# Patient Record
Sex: MALE | Race: WHITE | NOT HISPANIC OR LATINO | Employment: FULL TIME | URBAN - NONMETROPOLITAN AREA
[De-identification: names, ages, dates, MRNs, and addresses within clinical notes are randomized per-mention and may not be internally consistent; named-entity substitution may affect disease eponyms.]

---

## 2020-01-08 ENCOUNTER — OFFICE VISIT (OUTPATIENT)
Dept: FAMILY MEDICINE | Facility: OTHER | Age: 40
End: 2020-01-08
Attending: FAMILY MEDICINE
Payer: COMMERCIAL

## 2020-01-08 VITALS
BODY MASS INDEX: 28.58 KG/M2 | HEIGHT: 72 IN | WEIGHT: 211 LBS | OXYGEN SATURATION: 98 % | RESPIRATION RATE: 16 BRPM | TEMPERATURE: 98.5 F | DIASTOLIC BLOOD PRESSURE: 60 MMHG | SYSTOLIC BLOOD PRESSURE: 122 MMHG | HEART RATE: 65 BPM

## 2020-01-08 DIAGNOSIS — L29.9 PRURITIC DISORDER: Primary | ICD-10-CM

## 2020-01-08 LAB
ALBUMIN SERPL-MCNC: 4.6 G/DL (ref 3.5–5.7)
ALP SERPL-CCNC: 47 U/L (ref 34–104)
ALT SERPL W P-5'-P-CCNC: 22 U/L (ref 7–52)
ANION GAP SERPL CALCULATED.3IONS-SCNC: 7 MMOL/L (ref 3–14)
AST SERPL W P-5'-P-CCNC: 19 U/L (ref 13–39)
BASOPHILS # BLD AUTO: 0 10E9/L (ref 0–0.2)
BASOPHILS NFR BLD AUTO: 0.7 %
BILIRUB SERPL-MCNC: 0.8 MG/DL (ref 0.3–1)
BUN SERPL-MCNC: 16 MG/DL (ref 7–25)
CALCIUM SERPL-MCNC: 9.1 MG/DL (ref 8.6–10.3)
CHLORIDE SERPL-SCNC: 102 MMOL/L (ref 98–107)
CO2 SERPL-SCNC: 28 MMOL/L (ref 21–31)
CREAT SERPL-MCNC: 0.88 MG/DL (ref 0.7–1.3)
DIFFERENTIAL METHOD BLD: NORMAL
EOSINOPHIL # BLD AUTO: 0.4 10E9/L (ref 0–0.7)
EOSINOPHIL NFR BLD AUTO: 7.1 %
ERYTHROCYTE [DISTWIDTH] IN BLOOD BY AUTOMATED COUNT: 12.5 % (ref 10–15)
GFR SERPL CREATININE-BSD FRML MDRD: >90 ML/MIN/{1.73_M2}
GLUCOSE SERPL-MCNC: 106 MG/DL (ref 70–105)
HCT VFR BLD AUTO: 40.4 % (ref 40–53)
HGB BLD-MCNC: 13.7 G/DL (ref 13.3–17.7)
IMM GRANULOCYTES # BLD: 0 10E9/L (ref 0–0.4)
IMM GRANULOCYTES NFR BLD: 0.4 %
LYMPHOCYTES # BLD AUTO: 2.2 10E9/L (ref 0.8–5.3)
LYMPHOCYTES NFR BLD AUTO: 40.8 %
MCH RBC QN AUTO: 29.4 PG (ref 26.5–33)
MCHC RBC AUTO-ENTMCNC: 33.9 G/DL (ref 31.5–36.5)
MCV RBC AUTO: 87 FL (ref 78–100)
MONOCYTES # BLD AUTO: 0.6 10E9/L (ref 0–1.3)
MONOCYTES NFR BLD AUTO: 11.4 %
NEUTROPHILS # BLD AUTO: 2.1 10E9/L (ref 1.6–8.3)
NEUTROPHILS NFR BLD AUTO: 39.6 %
PLATELET # BLD AUTO: 270 10E9/L (ref 150–450)
POTASSIUM SERPL-SCNC: 3.7 MMOL/L (ref 3.5–5.1)
PROT SERPL-MCNC: 7.4 G/DL (ref 6.4–8.9)
RBC # BLD AUTO: 4.66 10E12/L (ref 4.4–5.9)
SODIUM SERPL-SCNC: 137 MMOL/L (ref 134–144)
TSH SERPL DL<=0.05 MIU/L-ACNC: 2.33 IU/ML (ref 0.34–5.6)
WBC # BLD AUTO: 5.3 10E9/L (ref 4–11)

## 2020-01-08 PROCEDURE — 85025 COMPLETE CBC W/AUTO DIFF WBC: CPT | Mod: ZL | Performed by: FAMILY MEDICINE

## 2020-01-08 PROCEDURE — 99213 OFFICE O/P EST LOW 20 MIN: CPT | Performed by: FAMILY MEDICINE

## 2020-01-08 PROCEDURE — 84443 ASSAY THYROID STIM HORMONE: CPT | Mod: ZL | Performed by: FAMILY MEDICINE

## 2020-01-08 PROCEDURE — 36415 COLL VENOUS BLD VENIPUNCTURE: CPT | Mod: ZL | Performed by: FAMILY MEDICINE

## 2020-01-08 PROCEDURE — 80053 COMPREHEN METABOLIC PANEL: CPT | Mod: ZL | Performed by: FAMILY MEDICINE

## 2020-01-08 RX ORDER — HYDROXYZINE HYDROCHLORIDE 10 MG/1
10 TABLET, FILM COATED ORAL EVERY 8 HOURS PRN
Qty: 90 TABLET | Refills: 1 | Status: SHIPPED | OUTPATIENT
Start: 2020-01-08 | End: 2020-01-24

## 2020-01-08 SDOH — HEALTH STABILITY: MENTAL HEALTH: HOW OFTEN DO YOU HAVE A DRINK CONTAINING ALCOHOL?: MONTHLY OR LESS

## 2020-01-08 ASSESSMENT — PAIN SCALES - GENERAL: PAINLEVEL: NO PAIN (0)

## 2020-01-08 ASSESSMENT — ENCOUNTER SYMPTOMS
NAUSEA: 0
DYSURIA: 0
NUMBNESS: 0
PARESTHESIAS: 0
DIFFICULTY URINATING: 0
CHILLS: 0
SHORTNESS OF BREATH: 0
FEVER: 0
VOMITING: 0
ABDOMINAL PAIN: 0

## 2020-01-08 ASSESSMENT — MIFFLIN-ST. JEOR: SCORE: 1905.09

## 2020-01-08 NOTE — PROGRESS NOTES
SUBJECTIVE:   Kemal Garibay is a 39 year old male who presents to clinic today for the following health issues:    HPI  Pruritus: Reports that symptoms have been present over the past 2 years and worsening.  Describes itching diffusely over his skin, occasionally breaking out in raised lesions.  Symptoms persist despite living in 3 different locations or staying at others' houses.  Denies any new medications.  No new personal hygiene products.  Symptoms are worsened by hot showers.  He has been taking OTC antihistamine medications and occasionally using hydrocortisone cream with some improvement in his symptoms.  He occasionally takes omeprazole for acid reflux.  No official history of allergies or eczema.    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 Arshad     History reviewed. No pertinent family history.  Social History     Tobacco Use     Smoking status: Never Smoker     Smokeless tobacco: Current User     Types: Chew   Substance Use Topics     Alcohol use: Yes     Frequency: Monthly or less     Current Outpatient Medications   Medication Sig Dispense Refill     hydrOXYzine (ATARAX) 10 MG tablet Take 1 tablet (10 mg) by mouth every 8 hours as needed for itching 90 tablet 1     No Known Allergies    Review of Systems   Constitutional: Negative for chills and fever.   Respiratory: Negative for shortness of breath.    Cardiovascular: Negative for chest pain.   Gastrointestinal: Negative for abdominal pain, nausea and vomiting.   Genitourinary: Negative for difficulty urinating and dysuria.   Neurological: Negative for numbness and paresthesias.      OBJECTIVE:     /60   Pulse 65   Temp 98.5  F (36.9  C) (Temporal)   Resp 16   Ht 1.829 m (6')   Wt 95.7 kg (211 lb)   SpO2 98%   BMI 28.62  kg/m    Body mass index is 28.62 kg/m .  Physical Exam  Constitutional:       General: He is not in acute distress.     Appearance: Normal appearance. He is not ill-appearing.   HENT:      Mouth/Throat:      Comments: Tongue fasciculations.  Cardiovascular:      Rate and Rhythm: Normal rate and regular rhythm.      Heart sounds: No murmur. No friction rub. No gallop.    Pulmonary:      Effort: Pulmonary effort is normal.      Breath sounds: Normal breath sounds. No wheezing, rhonchi or rales.   Abdominal:      General: Bowel sounds are normal.      Palpations: Abdomen is soft.   Skin:     Comments: Erythematous, mildly raised lines, with scratching of skin.   Neurological:      Mental Status: He is alert.         ASSESSMENT/PLAN:     1. Pruritic disorder  Dermatographia vs urticaria.  Will check CBC, CMP, and TSH to help rule out uremia, hyperbilirubinemia, polycythemia vera, and thyroid disorder as potential causes.  Continue daily antihistamine use.  Hydroxyzine provided as needed for itching.  Consider addition of H2 blocker.  Follow-up in 4 weeks for reassessment.  - hydrOXYzine (ATARAX) 10 MG tablet; Take 1 tablet (10 mg) by mouth every 8 hours as needed for itching  Dispense: 90 tablet; Refill: 1  - TSH  - CBC and Differential  - Comprehensive Metabolic Panel      DO DON Buchanan [facility] AND [facility]

## 2020-01-08 NOTE — NURSING NOTE
Chief Complaint   Patient presents with     Derm Problem     itchy skin getting worse x 2 years, mostly at night         Initial /60   Pulse 65   Temp 98.5  F (36.9  C) (Temporal)   Resp 16   Ht 1.829 m (6')   Wt 95.7 kg (211 lb)   SpO2 98%   BMI 28.62 kg/m   Estimated body mass index is 28.62 kg/m  as calculated from the following:    Height as of this encounter: 1.829 m (6').    Weight as of this encounter: 95.7 kg (211 lb).    Medication Reconciliation: complete      Norma J. Gosselin, LPN

## 2020-01-08 NOTE — PATIENT INSTRUCTIONS
Nonspecific Management of Pruritus  Use skin lubricants liberally: petrolatum or lubricant cream at bedtime; alcohol-free, hypoallergenic lotions frequently during the day.    Decrease frequency of bathing and limit bathing to brief exposure to tepid water; after bathing, briefly pat skin dry and immediately apply skin lubricant.    Use mild, unscented, hypoallergenic soap two to three times per week; limit daily use of soap to groin and axillae (spare legs, arms, and torso).    Humidify dry indoor environment, especially in winter.    Choose clothing that does not irritate the skin (preferably made of doubly rinsed cotton or silk); avoid clothing made of wool, smooth-textured cotton, or heat-retaining material (synthetic fabrics); when washing sheets, add bath oil (e.g., Alpha Kathy) to rinse cycle.    Avoid use of vasodilators (caffeine, alcohol, spices, hot water) and excessive sweating.    Avoid use of provocative topical medications, such as corticosteroids for prolonged periods (risk of skin atrophy) and topical anesthetics and antihistamines (may sensitize exposed skin and increase risk of allergic contact dermatitis).    Prevent complications of scratching by keeping fingernails short and clean, and by rubbing skin with the palms of the hands if urge to scratch is irresistible.

## 2020-01-17 ENCOUNTER — TELEPHONE (OUTPATIENT)
Dept: FAMILY MEDICINE | Facility: OTHER | Age: 40
End: 2020-01-17

## 2020-01-23 NOTE — RESULT ENCOUNTER NOTE
If he has seen no improvement with medications and the lifestyle adjustments we discussed, he can return for an additional office visit.

## 2020-01-24 ENCOUNTER — OFFICE VISIT (OUTPATIENT)
Dept: FAMILY MEDICINE | Facility: OTHER | Age: 40
End: 2020-01-24
Attending: FAMILY MEDICINE
Payer: COMMERCIAL

## 2020-01-24 VITALS
OXYGEN SATURATION: 95 % | RESPIRATION RATE: 16 BRPM | HEART RATE: 70 BPM | WEIGHT: 211 LBS | HEIGHT: 72 IN | SYSTOLIC BLOOD PRESSURE: 130 MMHG | BODY MASS INDEX: 28.58 KG/M2 | TEMPERATURE: 97.2 F | DIASTOLIC BLOOD PRESSURE: 68 MMHG

## 2020-01-24 DIAGNOSIS — L29.9 PRURITIC DISORDER: Primary | ICD-10-CM

## 2020-01-24 PROCEDURE — 99213 OFFICE O/P EST LOW 20 MIN: CPT | Performed by: FAMILY MEDICINE

## 2020-01-24 RX ORDER — HYDROXYZINE HYDROCHLORIDE 25 MG/1
25 TABLET, FILM COATED ORAL
Qty: 30 TABLET | Refills: 1 | Status: SHIPPED | OUTPATIENT
Start: 2020-01-24 | End: 2022-03-29

## 2020-01-24 ASSESSMENT — MIFFLIN-ST. JEOR: SCORE: 1905.09

## 2020-01-24 ASSESSMENT — PAIN SCALES - GENERAL: PAINLEVEL: NO PAIN (0)

## 2020-01-24 ASSESSMENT — ENCOUNTER SYMPTOMS
FEVER: 0
CHILLS: 0

## 2020-01-24 NOTE — PROGRESS NOTES
SUBJECTIVE:   Kemal Garibay is a 39 year old male who presents to clinic today for the following health issues:    HPI  Pruritus:  Has been taking Hydroxyzine as needed.  Finds that it does well if he takes two tablets at night.  He has been taking cooler showers and states that this seems to help some.  He stopped taking allergy medicine.  Symptoms continue to be worse at night and have persisted throughout multiple moves.  He has given up having both a dog and a cat.  He has switched to a hypoallergenic laundry detergent.  States that overall symptoms are not worse but are not improved.    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 Arshad     History reviewed. No pertinent family history.  Social History     Tobacco Use     Smoking status: Never Smoker     Smokeless tobacco: Current User     Types: Chew   Substance Use Topics     Alcohol use: Yes     Frequency: Monthly or less     Current Outpatient Medications   Medication Sig Dispense Refill     hydrOXYzine (ATARAX) 10 MG tablet Take 1 tablet (10 mg) by mouth every 8 hours as needed for itching 90 tablet 1     No Known Allergies    Review of Systems   Constitutional: Negative for chills and fever.      OBJECTIVE:     /68   Pulse 70   Temp 97.2  F (36.2  C) (Temporal)   Resp 16   Ht 1.829 m (6')   Wt 95.7 kg (211 lb)   SpO2 95%   BMI 28.62 kg/m    Body mass index is 28.62 kg/m .  Physical Exam  Constitutional:       Appearance: Normal appearance.   Cardiovascular:      Rate and Rhythm: Normal rate.   Pulmonary:      Effort: Pulmonary effort is normal.   Skin:     Comments: Random areas of erythema on skin which appear to be secondary to contact.   Neurological:      Mental Status: He is alert.   Psychiatric:         Mood and Affect:  Mood normal.       ASSESSMENT/PLAN:     1. Pruritic disorder  Unclear etiology.  Encouraged daily use of H1 antagonist (Zyrtec, Allegra, or Claritin) in addition to H2 antagonist (Pepcid).  Increase Hydroxyzine to 25 mg for use at night as this is how it has been helping him.  Continue lifestyle modifications.  Encouraged use of hypoallergenic non-scented laundry detergent, fabric softener, body wash, soaps, and lotions.  Referral for allergy testing.  If no improvement with antihistamines and avoidance, will plan for dermatology referral.  - ALLERGY/ASTHMA ADULT REFERRAL  - hydrOXYzine (ATARAX) 25 MG tablet; Take 1 tablet (25 mg) by mouth nightly as needed for itching  Dispense: 30 tablet; Refill: 1      DO DON Buchanan [facility] AND \[facility] [place] [facility]\""

## 2020-01-24 NOTE — NURSING NOTE
Chief Complaint   Patient presents with     RECHECK     itching     Still itching.    Initial /68   Pulse 70   Temp 97.2  F (36.2  C) (Temporal)   Resp 16   Ht 1.829 m (6')   Wt 95.7 kg (211 lb)   SpO2 95%   BMI 28.62 kg/m   Estimated body mass index is 28.62 kg/m  as calculated from the following:    Height as of this encounter: 1.829 m (6').    Weight as of this encounter: 95.7 kg (211 lb).    Medication Reconciliation: complete      Norma J. Gosselin, LPN

## 2020-03-11 ENCOUNTER — HEALTH MAINTENANCE LETTER (OUTPATIENT)
Age: 40
End: 2020-03-11

## 2020-09-29 ENCOUNTER — VIRTUAL VISIT (OUTPATIENT)
Dept: FAMILY MEDICINE | Facility: OTHER | Age: 40
End: 2020-09-29
Attending: PHYSICIAN ASSISTANT
Payer: COMMERCIAL

## 2020-09-29 DIAGNOSIS — Z20.822 EXPOSURE TO COVID-19 VIRUS: ICD-10-CM

## 2020-09-29 DIAGNOSIS — Z20.822 EXPOSURE TO COVID-19 VIRUS: Primary | ICD-10-CM

## 2020-09-29 PROCEDURE — 99212 OFFICE O/P EST SF 10 MIN: CPT | Mod: TEL | Performed by: PHYSICIAN ASSISTANT

## 2020-09-29 PROCEDURE — 99207 ZZC NO CHARGE NURSE ONLY: CPT

## 2020-09-29 PROCEDURE — C9803 HOPD COVID-19 SPEC COLLECT: HCPCS

## 2020-09-29 PROCEDURE — U0003 INFECTIOUS AGENT DETECTION BY NUCLEIC ACID (DNA OR RNA); SEVERE ACUTE RESPIRATORY SYNDROME CORONAVIRUS 2 (SARS-COV-2) (CORONAVIRUS DISEASE [COVID-19]), AMPLIFIED PROBE TECHNIQUE, MAKING USE OF HIGH THROUGHPUT TECHNOLOGIES AS DESCRIBED BY CMS-2020-01-R: HCPCS | Mod: ZL | Performed by: PHYSICIAN ASSISTANT

## 2020-09-29 RX ORDER — FAMOTIDINE 20 MG/1
20 TABLET, FILM COATED ORAL
COMMUNITY
Start: 2020-02-06 | End: 2023-04-18

## 2020-09-29 ASSESSMENT — PAIN SCALES - GENERAL: PAINLEVEL: NO PAIN (0)

## 2020-09-29 NOTE — PATIENT INSTRUCTIONS
Regardless of if you have been tested or not for COVID-19:  -       You've had no fever--and no medicine that reduces fever--for 1 full day (24 hours), and    -       Your other symptoms have resolved (gotten better). For example, your cough or breathing has improved, and   -       At least 10 days have passed since your symptoms started    Patient who have symptoms (cough, fever, or shortness of breath), need to isolate for 10 days from when symptoms started OR 24 hours after fever resolves (without fever reducing medications) AND improvement of respiratory symptoms (whichever is longer).       Isolate yourself at home (in own room/own bathroom if possible)    Do Not allow any visitors    Do Not go to work or school    Do Not go to [employer],  centers, shopping, or other public places.    Do Not shake hands.    Avoid close and intimate contact with others (hugging, kissing).    Follow CDC recommendations for household cleaning of frequently touched services.      After the initial 10 days, continue to isolate yourself from household members as much as possible. To continue decrease the risk of community spread and exposure, you and any members of your household should limit activities in public for 14 days after starting home isolation.      You can reference the following CDC link for helpful home isolation/care tips:  https://www.cdc.gov/coronavirus/2019-ncov/downloads/10Things.pdf     Protect Others:    Cover Your Mouth and Nose with a mask, disposable tissue or wash cloth to avoid spreading germs to others.    Wash your hands and face frequently with soap and water     Call Back If: Breathing difficulty develops or you become worse.     For more information about COVID19 and options for caring for yourself at home, please visit the CDC website at https://www.cdc.gov/coronavirus/2019-ncov/about/steps-when-sick.html  For more options for care at [facility], please visit our website at  [email or web address]

## 2020-09-29 NOTE — NURSING NOTE
Chief Complaint   Patient presents with     Covid 19 Testing     Patient is calling to see if he can get a covid test due to living with a patient who came back positive today and his work is requiring him to be tested.    Initial There were no vitals taken for this visit. Estimated body mass index is 28.62 kg/m  as calculated from the following:    Height as of 1/24/20: 1.829 m (6').    Weight as of 1/24/20: 95.7 kg (211 lb).    Medication Reconciliation: complete      Lucila Driscoll LPN

## 2020-09-29 NOTE — PROGRESS NOTES
"Kemal Garibay is a 40 year old male who is being evaluated via a billable telephone visit.      The patient has been notified of following: Lucila Driscoll LPN .......  9/29/2020  1:40 PM      \"This telephone visit will be conducted via a call between you and your physician/provider. We have found that certain health care needs can be provided without the need for a physical exam.  This service lets us provide the care you need with a short phone conversation.  If a prescription is necessary we can send it directly to your pharmacy.  If lab work is needed we can place an order for that and you can then stop by our lab to have the test done at a later time.    Telephone visits are billed at different rates depending on your insurance coverage. During this emergency period, for some insurers they may be billed the same as an in-person visit.  Please reach out to your insurance provider with any questions.    If during the course of the call the physician/provider feels a telephone visit is not appropriate, you will not be charged for this service.\"    Patient has given verbal consent for Telephone visit?  Yes    What phone number would you like to be contacted at? [phone]    How would you like to obtain your AVS? Kobe Hernández     Kemal Garibay is a 40 year old male who presents via phone visit today for the following health issues:    HPI    Patient is calling interested in getting a COVID-19 test.  His wife tested positive for COVID-19 this morning.  Patient states that he needs to be tested for his job.  Patient is currently not having symptoms and is feeling well.  No recent fevers, chills, cough, shortness of breath or difficulty breathing, new muscle or body aches, new headache, new loss of taste or smell, sore throat, congestion or runny nose, new and unexplained nausea or vomiting, diarrhea, or new and unexplained fatigue.     Review of Systems   Constitutional, HEENT, cardiovascular, pulmonary, gi " and gu systems are negative, except as otherwise noted.       Objective          Vitals:  No vitals were obtained today due to virtual visit.    healthy, alert and no distress  PSYCH: Alert and oriented times 3; coherent speech, normal   rate and volume, able to articulate logical thoughts, able   to abstract reason, no tangential thoughts, no hallucinations   or delusions  His affect is normal  RESP: No cough, no audible wheezing, able to talk in full sentences  Remainder of exam unable to be completed due to telephone visits          Assessment/Plan:    Assessment & Plan   Problem List Items Addressed This Visit     None           Ordered COVID-19 test to be completed due to the exposure.  Gave patient education.    Patient Instructions   Regardless of if you have been tested or not for COVID-19:  -       You've had no fever--and no medicine that reduces fever--for 1 full day (24 hours), and    -       Your other symptoms have resolved (gotten better). For example, your cough or breathing has improved, and   -       At least 10 days have passed since your symptoms started    Patient who have symptoms (cough, fever, or shortness of breath), need to isolate for 10 days from when symptoms started OR 24 hours after fever resolves (without fever reducing medications) AND improvement of respiratory symptoms (whichever is longer).       Isolate yourself at home (in own room/own bathroom if possible)    Do Not allow any visitors    Do Not go to work or school    Do Not go to Religion,  centers, shopping, or other public places.    Do Not shake hands.    Avoid close and intimate contact with others (hugging, kissing).    Follow CDC recommendations for household cleaning of frequently touched services.      After the initial 10 days, continue to isolate yourself from household members as much as possible. To continue decrease the risk of community spread and exposure, you and any members of your household should  limit activities in public for 14 days after starting home isolation.      You can reference the following CDC link for helpful home isolation/care tips:  https://www.cdc.gov/coronavirus/2019-ncov/downloads/10Things.pdf     Protect Others:    Cover Your Mouth and Nose with a mask, disposable tissue or wash cloth to avoid spreading germs to others.    Wash your hands and face frequently with soap and water     Call Back If: Breathing difficulty develops or you become worse.     For more information about COVID19 and options for caring for yourself at home, please visit the CDC website at https://www.cdc.gov/coronavirus/2019-ncov/about/steps-when-sick.html  For more options for care at [facility], please visit our website at [email or web address]           No follow-ups on file.    Kacey Hernandez PA-C  [facility]    Phone call duration:  7 minutes

## 2020-10-01 LAB
SARS-COV-2 RNA SPEC QL NAA+PROBE: NOT DETECTED
SPECIMEN SOURCE: NORMAL

## 2020-11-25 DIAGNOSIS — L29.9 PRURITIC DISORDER: ICD-10-CM

## 2020-11-25 RX ORDER — HYDROXYZINE HYDROCHLORIDE 10 MG/1
10 TABLET, FILM COATED ORAL EVERY 8 HOURS PRN
Qty: 90 TABLET | Refills: 0 | Status: SHIPPED | OUTPATIENT
Start: 2020-11-25 | End: 2022-03-29

## 2020-11-25 NOTE — TELEPHONE ENCOUNTER
[facility] sent Rx request for the following:   hydrOXYzine (ATARAX) 10 MG tablet  Sig: Take 1 tablet (10 mg) by mouth every 8 hours as needed for itching    Last Prescription Date:   01/24/2020  Last Fill Qty/Refills:         30, R-1    Last Office Visit:              01/24/2020 (Jennifer)   Future Office visit:           None noted    Antihistamines Protocol Passed - 11/25/2020 12:17 PM     Prescription approved per [facility] [facility].  Laura Vela RN ....................  11/25/2020   4:09 PM

## 2021-01-03 ENCOUNTER — HEALTH MAINTENANCE LETTER (OUTPATIENT)
Age: 41
End: 2021-01-03

## 2021-04-25 ENCOUNTER — HEALTH MAINTENANCE LETTER (OUTPATIENT)
Age: 41
End: 2021-04-25

## 2021-10-09 ENCOUNTER — HEALTH MAINTENANCE LETTER (OUTPATIENT)
Age: 41
End: 2021-10-09

## 2021-11-09 ENCOUNTER — ALLIED HEALTH/NURSE VISIT (OUTPATIENT)
Dept: FAMILY MEDICINE | Facility: OTHER | Age: 41
End: 2021-11-09
Attending: FAMILY MEDICINE
Payer: COMMERCIAL

## 2021-11-09 DIAGNOSIS — Z20.822 COVID-19 RULED OUT: Primary | ICD-10-CM

## 2021-11-09 DIAGNOSIS — J02.0 STREPTOCOCCAL SORE THROAT: ICD-10-CM

## 2021-11-09 DIAGNOSIS — R09.81 CONGESTION OF PARANASAL SINUS: ICD-10-CM

## 2021-11-09 PROCEDURE — C9803 HOPD COVID-19 SPEC COLLECT: HCPCS

## 2021-11-09 PROCEDURE — U0003 INFECTIOUS AGENT DETECTION BY NUCLEIC ACID (DNA OR RNA); SEVERE ACUTE RESPIRATORY SYNDROME CORONAVIRUS 2 (SARS-COV-2) (CORONAVIRUS DISEASE [COVID-19]), AMPLIFIED PROBE TECHNIQUE, MAKING USE OF HIGH THROUGHPUT TECHNOLOGIES AS DESCRIBED BY CMS-2020-01-R: HCPCS | Mod: ZL

## 2021-11-10 LAB — SARS-COV-2 RNA RESP QL NAA+PROBE: POSITIVE

## 2022-03-29 ENCOUNTER — OFFICE VISIT (OUTPATIENT)
Dept: FAMILY MEDICINE | Facility: OTHER | Age: 42
End: 2022-03-29
Attending: FAMILY MEDICINE
Payer: COMMERCIAL

## 2022-03-29 VITALS
WEIGHT: 228 LBS | HEIGHT: 72 IN | BODY MASS INDEX: 30.88 KG/M2 | RESPIRATION RATE: 16 BRPM | SYSTOLIC BLOOD PRESSURE: 126 MMHG | DIASTOLIC BLOOD PRESSURE: 68 MMHG | TEMPERATURE: 97.8 F | HEART RATE: 76 BPM | OXYGEN SATURATION: 93 %

## 2022-03-29 DIAGNOSIS — R73.03 PREDIABETES: ICD-10-CM

## 2022-03-29 DIAGNOSIS — J35.1 ENLARGED TONSILS: ICD-10-CM

## 2022-03-29 DIAGNOSIS — R40.0 DAYTIME SOMNOLENCE: ICD-10-CM

## 2022-03-29 DIAGNOSIS — Z00.00 ANNUAL PHYSICAL EXAM: Primary | ICD-10-CM

## 2022-03-29 DIAGNOSIS — R06.83 SNORING: ICD-10-CM

## 2022-03-29 DIAGNOSIS — L50.3 DERMATOGRAPHISM: ICD-10-CM

## 2022-03-29 LAB
CHOLEST SERPL-MCNC: 197 MG/DL
FASTING STATUS PATIENT QL REPORTED: YES
HBA1C MFR BLD: 6 % (ref 4–6.2)
HDLC SERPL-MCNC: 31 MG/DL (ref 23–92)
LDLC SERPL CALC-MCNC: 114 MG/DL
NONHDLC SERPL-MCNC: 166 MG/DL
TRIGL SERPL-MCNC: 262 MG/DL

## 2022-03-29 PROCEDURE — 80061 LIPID PANEL: CPT | Mod: ZL | Performed by: FAMILY MEDICINE

## 2022-03-29 PROCEDURE — 99212 OFFICE O/P EST SF 10 MIN: CPT | Mod: 25 | Performed by: FAMILY MEDICINE

## 2022-03-29 PROCEDURE — 90715 TDAP VACCINE 7 YRS/> IM: CPT | Performed by: FAMILY MEDICINE

## 2022-03-29 PROCEDURE — 83036 HEMOGLOBIN GLYCOSYLATED A1C: CPT | Mod: ZL | Performed by: FAMILY MEDICINE

## 2022-03-29 PROCEDURE — 36415 COLL VENOUS BLD VENIPUNCTURE: CPT | Mod: ZL | Performed by: FAMILY MEDICINE

## 2022-03-29 PROCEDURE — 90471 IMMUNIZATION ADMIN: CPT | Performed by: FAMILY MEDICINE

## 2022-03-29 PROCEDURE — 99396 PREV VISIT EST AGE 40-64: CPT | Performed by: FAMILY MEDICINE

## 2022-03-29 RX ORDER — HYDROXYZINE HYDROCHLORIDE 25 MG/1
25 TABLET, FILM COATED ORAL DAILY PRN
Qty: 90 TABLET | Refills: 1 | Status: SHIPPED | OUTPATIENT
Start: 2022-03-29 | End: 2023-04-18

## 2022-03-29 ASSESSMENT — PAIN SCALES - GENERAL: PAINLEVEL: NO PAIN (0)

## 2022-03-29 NOTE — PATIENT INSTRUCTIONS
Watch caffeine and alcohol intake in regards to reflux  Eat 2 hours before laying   Track behavior and food intake when you have a reflux bout    Referral for sleep study. Trying for a home sleep study  Depending on results could see ENT surgeon about tonsils

## 2022-03-29 NOTE — PROGRESS NOTES
Assessment & Plan       ICD-10-CM    1. 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 Enlarged tonsils  J35.1    4. Daytime somnolence  R40.0 OR SLEEP STUDY, UNATTENDED, SIMUL RECORD HR/O2 SAT/RESP FLOW/RESP EFF     Adult Sleep Eval & Management  Referral   5. Snoring  R06.83 OR SLEEP STUDY, UNATTENDED, SIMUL RECORD HR/O2 SAT/RESP FLOW/RESP EFF     Adult Sleep Eval & Management  Referral   6. Prediabetes  R73.03 Nutrition Referral   7. Body mass index (BMI) 30.0-30.9, adult  Z68.30 Nutrition Referral     Reviewed preventative maintenance.  Tdap updated  Declines COVID-19 and influenza vaccines.  Had COVID-19 November 2021 and received monoclonal antibody infusion, which should delay vaccination.  He is aware that he did not have a robust immune response because of the monoclonal antibody treatment.    Screening lipids obtained.  Low HDL.  ASCVD risk does not warrant statin treatment.  The 10-year ASCVD risk score ([place] DC Jr., et al., 2013) is: 2.6%    Values used to calculate the score:      Age: 41 years      Sex: Male      Is Non- : No      Diabetic: No      Tobacco smoker: No      Systolic Blood Pressure: 126 mmHg      Is BP treated: No      HDL Cholesterol: 31 mg/dL      Total Cholesterol: 197 mg/dL     A1c obtained given family history of diabetes.  Return in prediabetes range.  Referral placed to dietitian for further advice.  He does plan to resume regular exercise and try to drop weight.  This would help prevent progression to diabetes.    Recommend returning in a year to recheck lipids and A1c    Refilled hydroxyzine as needed for dermatographia is him    Has enlarged tonsils, but not recurrent tonsillitis.  Snores and has symptoms of sleep  apnea.  Only indication potentially for tonsillectomy would be some degree of sleep apnea.  Recommend evaluation of sleep apnea.  If he has a mild disorder, potentially surgical treatment with ENT could be of value or could consider mandibular advancement device.  Otherwise CPAP would be primary treatment.  Referral placed for home sleep study.  He has no risk factors that preclude at home study.    Review of prior external note(s) from - [facility]where information from [facility] reviewed       Tobacco Cessation:   reports that he has never smoked. His smokeless tobacco use includes chew.  Tobacco Cessation Action Plan: Information offered: Patient not interested at this time    BMI:   Estimated body mass index is 30.92 kg/m  as calculated from the following:    Height as of this encounter: 1.829 m (6').    Weight as of this encounter: 103.4 kg (228 lb).   Weight management plan: Discussed healthy diet and exercise guidelines    Return in about 1 year (around 3/29/2023).     50 minutes spent on the date of the encounter doing chart review, interpretation of tests, patient visit and documentation       Arnol Bryant MD  [facility]    Edgar Sommer is a 41 year old who presents for the following health issues     History of Present Illness       Reason for visit:  Annual check up; establish PCP  Symptom onset:  Today  Symptoms include:  None    He eats 2-3 servings of fruits and vegetables daily.He consumes 0 sweetened beverage(s) daily.He exercises with enough effort to increase his heart rate 9 or less minutes per day.  He exercises with enough effort to increase his heart rate 3 or less days per week.   He is taking medications regularly.     Dentist noted enlarged tonsils  Snores, potentially witnessed apneas  Sleeps 4-6 hours per night    Some times of reflux, only a few times in general.  No significant heartburn, but on famotidine  No smoking. Rare alcohol. Some coffee.    Has  itching to skin at night. Using famotidine nightly for itching.  When symptoms are worse, uses hydroxyzine as needed  Had evaluation with [facility] allergy in February 2020.  Diagnosed with dermatographia some.  Testing was negative.  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 Arshad      Family History   Problem Relation Age of Onset     No Known Problems Mother      Diabetes Father      Chronic Obstructive Pulmonary Disease Father      No Known Problems Maternal Grandmother      Coronary Artery Disease Maternal Grandfather 82     Diabetes Paternal Grandfather      Colon Cancer No family hx of      Prostate Cancer No family hx of             Objective    /68 (BP Location: Right arm, Patient Position: Sitting, Cuff Size: Adult Large)   Pulse 76   Temp 97.8  F (36.6  C) (Tympanic)   Resp 16   Ht 1.829 m (6')   Wt 103.4 kg (228 lb)   SpO2 93%   BMI 30.92 kg/m    Body mass index is 30.92 kg/m .  Physical Exam   General Appearance: Pleasant, alert, appropriate appearance for age. No acute distress  Eyes: EOMI, PERRL, no conjunctival injection  Ear Exam: Normal TM's bilaterally.   OroPharynx Exam: Normal pharynx.  2+ tonsils.  With palate elevation he has plenty of space.  No other abnormalities on pharyngeal exam.  Neck Exam: Supple, no masses or nodes.  Chest/Respiratory Exam: Normal chest wall and respirations. Clear to  auscultation.  Cardiovascular Exam: Regular rate and rhythm. S1, S2, no murmur, click, gallop, or rubs.  Gastrointestinal Exam: Soft, nontender, no abnormal masses or organomegaly.  Extremities: 2+ pedal pulses.  No lower extremity edema.  Neuro Exam: Alert, oriented x 3. CN II-XI intact. Reflexes 2+, strength symmetric upper and lower extremities  Psychiatric: Normal affect and mentation      Results for orders placed or performed in visit on 03/29/22   Lipid Panel     Status: Abnormal   Result Value Ref Range    Cholesterol 197 <200 mg/dL    Triglycerides 262 (H) <150 mg/dL    Direct Measure HDL 31 23 - 92 mg/dL    LDL Cholesterol Calculated 114 (H) <=100 mg/dL    Non HDL Cholesterol 166 (H) <130 mg/dL    Patient Fasting > 8hrs? Yes     Narrative    Cholesterol  Desirable:  <200 mg/dL    Triglycerides  Normal:  Less than 150 mg/dL  Borderline High:  150-199 mg/dL  High:  200-499 mg/dL  Very High:  Greater than or equal to 500 mg/dL    Direct Measure HDL  Female:  Greater than or equal to 50 mg/dL   Male:  Greater than or equal to 40 mg/dL    LDL Cholesterol  Desirable:  <100mg/dL  Above Desirable:  100-129 mg/dL   Borderline High:  130-159 mg/dL   High:  160-189 mg/dL   Very High:  >= 190 mg/dL    Non HDL Cholesterol  Desirable:  130 mg/dL  Above Desirable:  130-159 mg/dL  Borderline High:  160-189 mg/dL  High:  190-219 mg/dL  Very High:  Greater than or equal to 220 mg/dL   Hemoglobin A1c     Status: Normal   Result Value Ref Range    Hemoglobin A1C 6.0 4.0 - 6.2 %

## 2022-03-29 NOTE — NURSING NOTE
Patient presents to the clinic for establish care with concerns about tonsils.    FOOD SECURITY SCREENING QUESTIONS:    The next two questions are to help us understand your food security.  If you are feeling you need any assistance in this area, we have resources available to support you today.    Hunger Vital Signs:  Within the past 12 months we worried whether our food would run out before we got money to buy more. Never  Within the past 12 months the food we bought just didn't last and we didn't have money to get more. Never    Advance Care Directive on file? no  Advance Care Directive provided to patient? Declined.    Chief Complaint   Patient presents with     Establish Care     Throat Problem       Initial /68 (BP Location: Right arm, Patient Position: Sitting, Cuff Size: Adult Large)   Pulse 76   Temp 97.8  F (36.6  C) (Tympanic)   Resp 16   Ht 1.829 m (6')   Wt 103.4 kg (228 lb)   SpO2 93%   BMI 30.92 kg/m   Estimated body mass index is 30.92 kg/m  as calculated from the following:    Height as of this encounter: 1.829 m (6').    Weight as of this encounter: 103.4 kg (228 lb).  Medication Reconciliation: complete        Noemi Holman LPN

## 2022-04-01 ENCOUNTER — OFFICE VISIT (OUTPATIENT)
Dept: FAMILY MEDICINE | Facility: OTHER | Age: 42
End: 2022-04-01
Attending: FAMILY MEDICINE
Payer: COMMERCIAL

## 2022-04-01 VITALS — HEIGHT: 72 IN | BODY MASS INDEX: 30.88 KG/M2 | WEIGHT: 228 LBS

## 2022-04-01 DIAGNOSIS — R73.03 PREDIABETES: ICD-10-CM

## 2022-04-01 PROCEDURE — 97802 MEDICAL NUTRITION INDIV IN: CPT | Performed by: DIETITIAN, REGISTERED

## 2022-04-01 NOTE — PROGRESS NOTES
Nutrition Assessment    Patient seen for outpatient MNT initial assessment.    Referring diagnosis: prediabetes, BMI 30, hyperlipidemia     Height: Height: 1.829 m (6')  Weight: 228  BMI:30  BMI indication: 30-34.9 obesity (class 1)  Patient's Goal Weight: 200#    Labs: LDL Cholesterol Calculated       Date                     Value               Ref Range           Status                03/29/2022               114 (H)             <=100 mg/dL         Final            ----------  Direct Measure HDL       Date                     Value               Ref Range           Status                03/29/2022               31                  23 - 92 mg/dL       Final            ----------)  Lab Results       Component                Value               Date                       A1C                      6.0                 03/29/2022                Lifestyle changes made prior to nutrition session:  He and his wife are changing meal plans and thinking about getting back to the gym      Assessment of diet/weight history: Hx of trying vegan choices, he enjoyed that. Was doing overnight oatmeal for lunch. Eats all food groups, limiting dairy.      Assessment of physical activity: quit the gym with covid shut down but will join again. He enjoys exercise. Kemal has changed to more sitting so he knows this impacts his health.  Discussed min goal of 150 min of cardio per week.    Nutrition intervention that addressed medical nutrition therapy for above diagnosis: Weight management. Discussed 2000 calorie level. Meal plan provided and Lipid lowering strategies. Discussed 20 grams of saturated fat, 200mg of cholesterol, with soluble fiber foods, and plant stanol/sterols.  Gave Mediterranean diet guidelines.     RD reviewed diabetes, physical activity and Mediterranean diet    Education materials provided: sample meal plans with grocery lists. Good fat guidelines.    Goals: (1) A1C >5.7% in 6 months  (2) weight loss 5-7% in 6  months    Patient had no barriers to learning, verbalized understanding, and compliance is expected.    Phone number provided for questions. Recommended follow-up in as needed.    Time spent: 30 min  KRISTIN K. KLINEFELTER, RD on 4/1/2022 at 10:41 AM              ROS      Physical Exam

## 2022-04-12 ENCOUNTER — DOCUMENTATION ONLY (OUTPATIENT)
Dept: SLEEP MEDICINE | Facility: HOSPITAL | Age: 42
End: 2022-04-12

## 2022-04-12 NOTE — PROGRESS NOTES
STOP BANG       Name: Kemal Garibay MRN# [id number]   Age: 42 year old YOB: 1980     Stop Bang questionnaire completed with a score of >3 to allow for HST     Have you been told you snore loudly (louder than talking or loud enough to be heard through doors)? YES    Do you often feel tired, fatigued, or sleepy during the daytime? YES    Has anyone observed you stop breathing during your sleep? NO    Do you have or are you being treated for high blood pressure? NO    Is your BMI greater than 35? NO    Is your neck size circumference 16 inches or greater? NO    Are you over 50 years old? NO    Stop Bang Score (# of yes): 3

## 2022-04-12 NOTE — PROGRESS NOTES
SLEEP HISTORY QUESTIONNAIRE    Please describe the main reason for your sleep appointment? I've had many years of difficulty sleeping and getting a proper amount of actual sleep. Snoring. Woke up with acid reflux a few times, even choking on nothing.    How long has this been a problem? Years, cannot remember a sustained amount of time when it wasn't a problem.    Have you been diagnosed with a sleep problem in the past? NO    If so, what? n/a    What treatment was recommended? n/a    Have you had a sleep study in the past? NO    If yes, where and when? n/a    Sleep Habits:   Do you read in bed? No  Do you eat in bed? No  Do you watch TV in bed? Yes  Do you work in bed? No  Do you use a phone or computer in bed? Yes    Is you sleep disturbed by:   Bed partner: No  Children: No  Noise: Yes   Pets: No  Other: I tend to focus on constant ambient noise.      On two or more nights per week, do you drink alcohol to help you fall asleep?NO    On two or more nights per week, do you take melatonin to help you fall asleep? NO    On two or more nights per week, do you take over the counter medicine to fall asleep?  NO    Do you take drinks with caffeine (coffee, tea, soda, energy drinks)? YES    Do you have 3 or more caffeine drinks in a day? NO    Do you have caffeine drinks within 6 hours of bedtime? NO    Do you smoke or use tobacco? YES    Do you exercise? NO    Sleep Routine:   Using a 24 Hour Clock    What time do you usually get into bed on workdays? 11 pm    Weekend/non work days? 1 am    What time do you get out of bed on workdays? 5 am      Weekend/non work days? 8 am    Do you work the evening or night shift or do your shifts rotate? NO    How long does it usually take to fall to sleep? 1-3 hours    How many times do you wake during the night? 7    How much time do you feel that you are awake during the entire night? 20-30 minutes    How long does it take for you to fall back to sleep after you wake up? 10-20  minutes    Why do you think you wake up? Difficulty breathing due to weight and breathing    What do you do when you wake up? Think about going back to sleep    How much sleep do you think you get on work nights? 4-6 hours    How much sleep do you think you get on weekends/non work days? 6-7 hours    How much sleep do you think you need to feel your best? ?    How many days during a week do you take a nap on average? 2    What is the average length of your naps? 20 minutes - 3 hours    Do you feel better after taking a nap? NO    If you could chose the best sleep schedule for you, what time would you go to bed? ?  What time would you get up? ?    Do you read in bed? NO    Do you eat in bed? NO    Do you watch TV in bed? YES    Do you do work in bed? NO    Do you use a computer or phone in bed? YES    Sleep Disruptions?   Leg movements:  Do you ever have restless, crawling, aching or other unusual feelings in your legs? NO    Do you ever wake yourself by kicking your legs during the night? NO    Are the sheets and blankets messed up or tossed about when you get up? NO    Night-time behaviors:   Do you have nightmares or night terrors? YES   How often? Seldom, when I was a kid    Have you had times when you were sleep walking? YES    Have you been seen doing anything unusual while you sleep at nights? YES  What? talking  How often? Once a month    Have you ever hurt yourself or someone else while you were sleeping? NO  Please describe: n/a    Do you clench or grind your teeth during the night? YES    Sleep Apnea (pauses in breathing during sleep):  Do you wake with a headache in the morning? YES  How often? One time a month    Does your bed partner, family or friends ever say that you snore? YES  How many nights per week do you snore? frequent  Can snoring be heard outside the bedroom? NO    Do you ever wake yourself up from snoring, gasping or choking? YES    Have you ever been told that you stop breathing or have  pauses in your breathing? NO    Do you wake in the morning with a dry throat or mouth? YES    Do you have trouble breathing through your nose? YES    Do you have problems with heartburn, reflux or a hiatal hernia? YES    Which positions do you usually sleep in? (stomach, back, sides, all) sides    Do you use oxygen or any other medical equipment when you sleep? NO    Do members of your family (related by blood) snore? NO    Have any members of your family been diagnosed with with sleep apnea? NO    Do other members of your family have restless leg? NO    Do other members of your family have sleep walking? NO    Have you ever had an accident, or near accident due to sleepiness while driving? NO    Does your sleepiness affect your work on the job or at school? YES    Do you ever fall asleep by accident while doing a task? NO    Have you had sudden muscle weakness when laughing, angry or surprised? NO    Have you ever been unable to move your body when falling asleep or waking up? NO    Do you ever have trouble  your dreams from real life events? NO  Please describe: n/a    Physical Health: (including illness and injury): During the past 30 days, on how many days was your physical health not good? 3030 days     Mental Health: (including stress, depression, and problems with emotions): During the last 30 days, how may days was your mental health not good? 20-30 days.     During the past 30 days, on how many days did poor physical or mental health keep you from doing your usual activities? This might be self-care, work, or play? 15/30 days.     Social History:   Marital status:     Who lives in your home with you? Wife and son    Mother (alive or dead)? alive If has , from what? n/a  Father (alive or dead)? alive If has , from what? n/a    Siblings: YES  Have any ? NO  If so, from what? n/a    Currently working? 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 Arshad       Medical Conditions:   Past Medical History:   Diagnosis Date     Closed fracture of nasal bones     Age 17,Repaired       Medications:   Current Outpatient Medications   Medication Sig     famotidine (PEPCID) 20 MG tablet Take 20 mg by mouth     hydrOXYzine (ATARAX) 25 MG tablet Take 1 tablet (25 mg) by mouth daily as needed for itching     No current facility-administered medications for this visit.       Are you currently having any of the following symptoms?   General:   Obvious weight gain or loss YES  Fever, chills or sweats NO  Drug allergies: NO    Eyes:   Changes in vision NO  Blind spots NO  Double vision NO  Other NO    Ear, Nose and Throat:   Ear pain NO  Sore throat NO  Sinus pain NO  Post-nasal drip NO  Runny nose NO  Bloody nose NO    Heart:   Rapid or irregular heart beat NO  Chest pain or pressure NO  Out of breath when lying down NO  Swelling in feet or legs NO  High blood pressure NO  Heart disease NO    Nervous system   Headaches NO  Weakness in arms or legs NO  Numbness in arms of legs NO  Other: NO    Skin  Rashes NO  New moles or skin changes NO  Other NO    Lungs  Shortness of breath at rest NO  Shortness of breath with activity NO  Dry cough NO  Coughing up mucous or phlegm NO  Coughing up blood NO  Wheezing when breathing NO    Lymph System  Swollen lymph nodes NO  New lumps or bumps NO  Changes in breasts or discharge NO    Digestive System   Nausea or vomiting NO  Loose or watery stools NO  Hard, dry stools  (constipation) NO  Fat or grease in stools NO  Blood in stools NO  Stools are black or bloody NO  Abdominal (belly) pain NO    Urinary Tract   Pain when you urinate (pee) NO  Blood in your urine NO  Urinate (pee) more than normal NO  Irregular periods NO    Muscles and bones   Muscle pain NO  Joint or bone pain NO  Swollen joints NO  Other NO    Glands  Increased thirst or urination NO  Diabetes NO  Morning glucose: n/a  Afternoon glucose: n/a    Mental Health  Depression NO  Anxiety YES  Other mental health issues: YES, stress

## 2022-04-15 NOTE — PROGRESS NOTES
"Chart review prior to sleep testing.    Patient Summary:  42 year old yo male who is referred for poor sleep, insomnia, snoring.    Patient Active Problem List    Diagnosis Date Noted     Dermatographism 03/29/2022     Priority: Medium     Neck pain 11/15/2010     Priority: Medium       Current Outpatient Medications   Medication     famotidine (PEPCID) 20 MG tablet     hydrOXYzine (ATARAX) 25 MG tablet     No current facility-administered medications for this visit.       STOP-BANG score of 3, with unknown neck circumference.  [place] score of 13.  BMI of Estimated body mass index is 30.92 kg/m  as calculated from the following:    Height as of 4/1/22: 1.829 m (6').    Weight as of 4/1/22: 103.4 kg (228 lb).     Per questionnaire: \"I've had many years of difficulty sleeping and getting a proper amount of actual sleep. Snoring. Woke up with acid reflux a few times, even choking on nothing.\"    Caffeine use:  No for 3+ per day.  No for within 6 hours of bed.    Tobacco use: Yes    Sleep pattern:  Workdays.  11pm - 5am, total sleep time 4-6 hours.  Weekends.  1am - 8am, total sleep time 6-7 hours.  Time to fall asleep: ~1-3 hours.  Awakenings: 7 times per night, 10-20 minutes to return to sleep.  Napping.  2 days per week, 20 minutes - 2 hours per nap.    Yes to TV, phone in bed.    No for RLS screen.  No for sleep walking.  No for dream enactment behavior.  Yes for bruxism.    Yes for morning headaches.  Yes for snoring.  No for observed apnea.  No for FHx of ROSALVA.    SHx:  , lives with wife and son.  Works as .    A/P:  1.)  Borderline increased likelihood of ROSALVA with STOP-BANG score of 3.   - Would appear to be candidate for either home sleep testing or in-lab PSG.    2.)  Chronic sleep onset and maintenance insomnia  - Probable component of delayed sleep phase  - Likely psychophysiological insomnia with some poor sleep hygiene,   - Inadequate time allowed for sleep during work nights  - " Will collect more detailed history at clinic visit.    ---  This note was written with the assistance of the Dragon voice-dictation technology software. The final document, although reviewed, may contain errors. For corrections, please contact the office.    Van Castaneda MD    Sleep Medicine  [facility]  ([phone])  [facility]  ([phone])

## 2022-04-19 DIAGNOSIS — G47.33 OSA (OBSTRUCTIVE SLEEP APNEA): Primary | ICD-10-CM

## 2022-05-03 ENCOUNTER — OFFICE VISIT (OUTPATIENT)
Dept: SLEEP MEDICINE | Facility: HOSPITAL | Age: 42
End: 2022-05-03
Attending: FAMILY MEDICINE
Payer: COMMERCIAL

## 2022-05-03 DIAGNOSIS — G47.33 OBSTRUCTIVE SLEEP APNEA (ADULT) (PEDIATRIC): Primary | ICD-10-CM

## 2022-05-03 PROCEDURE — 95800 SLP STDY UNATTENDED: CPT | Mod: 26 | Performed by: FAMILY MEDICINE

## 2022-05-03 PROCEDURE — 95800 SLP STDY UNATTENDED: CPT

## 2022-05-05 NOTE — PROGRESS NOTES
Watchpat sent out 5/5/2020  Tracking number [id number]  Serial number- [id number]

## 2022-05-24 ENCOUNTER — TRANSFERRED RECORDS (OUTPATIENT)
Dept: HEALTH INFORMATION MANAGEMENT | Facility: CLINIC | Age: 42
End: 2022-05-24
Payer: COMMERCIAL

## 2022-05-26 NOTE — PROGRESS NOTES
WatchPAT data has been received and has been scored using rule 1B, 4%. Patient to follow up with provider to determine appropriate therapy.    Pat AHI: 1.8    Ordering Provider: Dr Van Castaneda      Sleep Technician/Technologist: Beverly PETERSEN

## 2022-06-09 ENCOUNTER — VIRTUAL VISIT (OUTPATIENT)
Dept: PULMONOLOGY | Facility: OTHER | Age: 42
End: 2022-06-09
Attending: FAMILY MEDICINE
Payer: COMMERCIAL

## 2022-06-09 VITALS — WEIGHT: 228 LBS | BODY MASS INDEX: 30.22 KG/M2 | HEIGHT: 73 IN

## 2022-06-09 DIAGNOSIS — G47.21 DELAYED SLEEP PHASE SYNDROME: Primary | ICD-10-CM

## 2022-06-09 PROCEDURE — 99202 OFFICE O/P NEW SF 15 MIN: CPT | Mod: 95 | Performed by: FAMILY MEDICINE

## 2022-06-09 ASSESSMENT — SLEEP AND FATIGUE QUESTIONNAIRES
HOW LIKELY ARE YOU TO NOD OFF OR FALL ASLEEP WHILE SITTING QUIETLY AFTER LUNCH WITHOUT ALCOHOL: MODERATE CHANCE OF DOZING
HOW LIKELY ARE YOU TO NOD OFF OR FALL ASLEEP WHILE SITTING INACTIVE IN A PUBLIC PLACE: WOULD NEVER DOZE
HOW LIKELY ARE YOU TO NOD OFF OR FALL ASLEEP WHEN YOU ARE A PASSENGER IN A CAR FOR AN HOUR WITHOUT A BREAK: HIGH CHANCE OF DOZING
HOW LIKELY ARE YOU TO NOD OFF OR FALL ASLEEP WHILE LYING DOWN TO REST IN THE AFTERNOON WHEN CIRCUMSTANCES PERMIT: SLIGHT CHANCE OF DOZING
HOW LIKELY ARE YOU TO NOD OFF OR FALL ASLEEP WHILE WATCHING TV: SLIGHT CHANCE OF DOZING
HOW LIKELY ARE YOU TO NOD OFF OR FALL ASLEEP WHILE SITTING AND READING: SLIGHT CHANCE OF DOZING
HOW LIKELY ARE YOU TO NOD OFF OR FALL ASLEEP IN A CAR, WHILE STOPPED FOR A FEW MINUTES IN TRAFFIC: WOULD NEVER DOZE
HOW LIKELY ARE YOU TO NOD OFF OR FALL ASLEEP WHILE SITTING AND TALKING TO SOMEONE: SLIGHT CHANCE OF DOZING

## 2022-06-09 ASSESSMENT — PAIN SCALES - GENERAL: PAINLEVEL: NO PAIN (0)

## 2022-06-09 NOTE — PROGRESS NOTES
"Kemal Garibay is a 42 year old male who is being evaluated via a billable video visit.       The patient has been notified of following:      \"This video visit will be conducted via a call between you and your physician/provider. We have found that certain health care needs can be provided without the need for an in-person physical exam.  This service lets us provide the care you need with a video conversation.  If a prescription is necessary we can send it directly to your pharmacy.  If lab work is needed we can place an order for that and you can then stop by our lab to have the test done at a later time.     Video visits are billed at different rates depending on your insurance coverage.  Please reach out to your insurance provider with any questions.     If during the course of the call the physician/provider feels a video visit is not appropriate, you will not be charged for this service.\"     Patient has given verbal consent for Video visit? Yes  How would you like to obtain your AVS? Mail a copy  If you are dropped from the video visit, the video invite should be resent to: Text to cell phone:   Will anyone else be joining your video visit? No  If patient encounters technical issues they should call [phone]      Video-Visit Details     Type of service:  Video Visit     Video Start Time: 4pm  Video End Time: 4:25pm    Originating Location (pt. Location): Home     Distant Location (provider location):  [facility]      Platform used for Video Visit: Topmall    Virtual visit for review of sleep testing results.     A/P:  1.)  No significant sleep-disordered breathing on watchPAT HST.   - Discussed potential for non-diagnostic home sleep testing under-reporting mild to moderate ROSALVA.  -He did not feel that there is need for further testing at this time.     2.)  Chronic sleep onset and maintenance insomnia  - Probable component of delayed sleep phase  - Likely psychophysiological " "insomnia with some poor sleep hygiene,   - Inadequate time allowed for sleep during work nights  -We discussed attempts at phase advancement to allow for increased sleep time, he is open to this and will send information via sciencebite.    SUBJECTIVE:  Kemal Garibay is a 42 year old male.    42 year old yo male who is referred for poor sleep, insomnia, snoring.    Today -we reviewed his home sleep test results.  His goals are to improve his health, concerns raised for obstructive sleep apnea by his dentist and physician.  He feels that his snoring is not highly bothersome.    STOP-BANG score of 3, with unknown neck circumference.  Mar Lin score of 13.  BMI of Estimated body mass index is 30.92 kg/m  as calculated from the following:    Height as of 4/1/22: 1.829 m (6').    Weight as of 4/1/22: 103.4 kg (228 lb).      Per questionnaire: \"I've had many years of difficulty sleeping and getting a proper amount of actual sleep. Snoring. Woke up with acid reflux a few times, even choking on nothing.\"     Caffeine use:  No for 3+ per day.  No for within 6 hours of bed.     Tobacco use: Yes     Sleep pattern:  Workdays.  11pm - 5am, total sleep time 4-6 hours.  Weekends.  1am - 8am, total sleep time 6-7 hours.  Time to fall asleep: ~1-3 hours.  Awakenings: 7 times per night, 10-20 minutes to return to sleep.  Napping.  2 days per week, 20 minutes - 2 hours per nap.     Yes to TV, phone in bed.     No for RLS screen.  No for sleep walking.  No for dream enactment behavior.  Yes for bruxism.     Yes for morning headaches.  Yes for snoring.  No for observed apnea.  No for FHx of ROSALVA.     SHx:  , lives with wife and son.  Works as .    WatchPAT - HOME SLEEP STUDY INTERPRETATION     Patient: Kemal Garibay  MRN: [id number]  YOB: 1980  Study Date: 5/24/2022  Referring Provider: No Ref-Primary, Physician  Ordering Provider: Van Castaneda MD, MD     Chain of custody patient verification " was not enabled.       Indications for Home Study: Kemal Garibay is a 42 year old male with minimal past medical history who presents with symptoms suggestive of obstructive sleep apnea given concerns of snoring, poor sleep quality, insomnia.     Estimated body mass index is 30.92 kg/m  as calculated from the following:    Height as of 4/1/22: 1.829 m (6').    Weight as of 4/1/22: 103.4 kg (228 lb).  [place] Sleepiness Scale: 13/24  STOP-BANG: 3/8     Data: A full night home sleep study was performed recording the standard physiologic parameters including peripheral arterial tonometry (PAT), sound/snoring, body position,  movement, sound, and oxygen saturation by pulse oximetry. Pulse rate was estimated by oximetry recording. Sleep staging (wake, REM, light, and deep sleep) was derived from PAT signal.  This study was considered adequate based on > 4 hours of quality oximetry and respiratory recording. As specified by the AASM Manual for the Scoring of Sleep and Associated events, version 2.3, Rule VIII.D 1B, 4% oxygen desaturation scoring for hypopneas is used as a standard of care on all home sleep apnea testing.     Total Recording Time: 7 hrs, 18 min  Total Sleep Time: 6 hrs, 10 min  % of Sleep Time REM: 35.1%     Respiratory:  Snoring: Snoring was present, though minimal.  Respiratory events: The PAT respiratory disturbance index [pRDI] was 10.1 events per hour.  The PAT apnea/hypopnea index [pAHI] was 1.8 events per hour.  ASHLI was 1.8 events per hour.  During REM sleep the pAHI was 3.7.  Sleep Associated Hypoxemia: sustained hypoxemia was not present. Mean oxygen saturation was 95%.  Minimum was 91%.  Time with saturation less than 88% was 0 minutes.     Heart Rate: By pulse oximetry normal rate was noted.      Position: Percent of time spent: supine - 49.5%, prone - 6.1%, on right - 44.2%, on left - 0.3%.  pAHI was 2 per hour supine, 5.4 per hour prone, 1.1 per hour on right side, and - per hour on left side.       Assessment:   - No significant sleep-disordered breathing.  - Sleep associated hypoxemia was not present.     Recommendations:  - Consider in-lab PSG if high pre-test clinical concern for ROSALVA given potential for non-diagnostic home sleep testing.  - Suggest optimizing sleep hygiene and avoiding sleep deprivation.  - Weight management.     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    Current Outpatient Medications   Medication Sig Dispense Refill     famotidine (PEPCID) 20 MG tablet Take 20 mg by mouth       hydrOXYzine (ATARAX) 25 MG tablet Take 1 tablet (25 mg) by mouth daily as needed for itching 90 tablet 1       OBJECTIVE:  There were no vitals taken for this visit.    Physical Exam     ---  This note was written with the assistance of the Dragon voice-dictation technology software. The final document, although reviewed, may contain errors. For corrections, please contact the office.    Van Castaneda MD    Sleep Medicine  [facility]  - [place], [place]  ([phone])  [place], [place]  ([phone])  [place], [place] ([phone])    Time spent on the date of service:  30 minutes.

## 2022-06-09 NOTE — PROGRESS NOTES
"Kemal is a 42 year old who is being evaluated via a billable video visit.      How would you like to obtain your AVS? MyChart  If the video visit is dropped, the invitation should be resent by: Send to e-mail at: [email or web address]  Will anyone else be joining your video visit? No  {If patient encounters technical issues they should call [phone] :[id number]}    Sarina Gutierrez  Video Start Time: {video visit start/end time for provider to select:152948}  Video-Visit Details    Type of service:  Video Visit    Video End Time:{video visit start/end time for provider to select:152948}    Originating Location (pt. Location): {video visit patient location:675104::\"Home\"}    Distant Location (provider location):  [facility]     Platform used for Video Visit: {Virtual Visit Platforms:609186::\"AmWell\"}  "

## 2023-04-18 ENCOUNTER — OFFICE VISIT (OUTPATIENT)
Dept: FAMILY MEDICINE | Facility: OTHER | Age: 43
End: 2023-04-18
Attending: FAMILY MEDICINE
Payer: COMMERCIAL

## 2023-04-18 VITALS
DIASTOLIC BLOOD PRESSURE: 70 MMHG | HEART RATE: 108 BPM | RESPIRATION RATE: 18 BRPM | OXYGEN SATURATION: 95 % | BODY MASS INDEX: 27.46 KG/M2 | TEMPERATURE: 98.6 F | HEIGHT: 73 IN | SYSTOLIC BLOOD PRESSURE: 130 MMHG | WEIGHT: 207.2 LBS

## 2023-04-18 DIAGNOSIS — R73.03 PREDIABETES: ICD-10-CM

## 2023-04-18 DIAGNOSIS — Z13.220 LIPID SCREENING: ICD-10-CM

## 2023-04-18 DIAGNOSIS — L50.3 DERMATOGRAPHISM: Primary | ICD-10-CM

## 2023-04-18 LAB
ALBUMIN SERPL BCG-MCNC: 4.7 G/DL (ref 3.5–5.2)
ALP SERPL-CCNC: 79 U/L (ref 40–129)
ALT SERPL W P-5'-P-CCNC: 22 U/L (ref 10–50)
ANION GAP SERPL CALCULATED.3IONS-SCNC: 11 MMOL/L (ref 7–15)
AST SERPL W P-5'-P-CCNC: 25 U/L (ref 10–50)
BILIRUB SERPL-MCNC: 0.6 MG/DL
BUN SERPL-MCNC: 15.1 MG/DL (ref 6–20)
CALCIUM SERPL-MCNC: 9.2 MG/DL (ref 8.6–10)
CHLORIDE SERPL-SCNC: 101 MMOL/L (ref 98–107)
CHOLEST SERPL-MCNC: 207 MG/DL
CREAT SERPL-MCNC: 0.89 MG/DL (ref 0.67–1.17)
DEPRECATED HCO3 PLAS-SCNC: 28 MMOL/L (ref 22–29)
GFR SERPL CREATININE-BSD FRML MDRD: >90 ML/MIN/1.73M2
GLUCOSE SERPL-MCNC: 104 MG/DL (ref 70–99)
HBA1C MFR BLD: 5.5 % (ref 4–6.2)
HDLC SERPL-MCNC: 38 MG/DL
LDLC SERPL CALC-MCNC: 112 MG/DL
NONHDLC SERPL-MCNC: 169 MG/DL
POTASSIUM SERPL-SCNC: 4.2 MMOL/L (ref 3.4–5.3)
PROT SERPL-MCNC: 8.1 G/DL (ref 6.4–8.3)
SODIUM SERPL-SCNC: 140 MMOL/L (ref 136–145)
TRIGL SERPL-MCNC: 283 MG/DL

## 2023-04-18 PROCEDURE — 99214 OFFICE O/P EST MOD 30 MIN: CPT | Performed by: FAMILY MEDICINE

## 2023-04-18 PROCEDURE — 83036 HEMOGLOBIN GLYCOSYLATED A1C: CPT | Mod: ZL | Performed by: FAMILY MEDICINE

## 2023-04-18 PROCEDURE — 36415 COLL VENOUS BLD VENIPUNCTURE: CPT | Mod: ZL | Performed by: FAMILY MEDICINE

## 2023-04-18 PROCEDURE — 80061 LIPID PANEL: CPT | Mod: ZL | Performed by: FAMILY MEDICINE

## 2023-04-18 PROCEDURE — 80053 COMPREHEN METABOLIC PANEL: CPT | Mod: ZL | Performed by: FAMILY MEDICINE

## 2023-04-18 RX ORDER — CETIRIZINE HYDROCHLORIDE 10 MG/1
10 TABLET ORAL 2 TIMES DAILY
Qty: 180 TABLET | Refills: 4 | COMMUNITY
Start: 2023-04-18

## 2023-04-18 RX ORDER — HYDROXYZINE HYDROCHLORIDE 25 MG/1
25 TABLET, FILM COATED ORAL DAILY PRN
Qty: 90 TABLET | Refills: 4 | Status: SHIPPED | OUTPATIENT
Start: 2023-04-18 | End: 2024-07-09

## 2023-04-18 RX ORDER — FAMOTIDINE 20 MG/1
20 TABLET, FILM COATED ORAL 2 TIMES DAILY
Qty: 180 TABLET | Refills: 4 | Status: SHIPPED | OUTPATIENT
Start: 2023-04-18 | End: 2024-07-09

## 2023-04-18 RX ORDER — CETIRIZINE HYDROCHLORIDE 10 MG/1
10 TABLET ORAL
COMMUNITY
Start: 2023-04-17 | End: 2023-04-18

## 2023-04-18 ASSESSMENT — PAIN SCALES - GENERAL: PAINLEVEL: NO PAIN (0)

## 2023-04-18 NOTE — PATIENT INSTRUCTIONS
Try cetirizine and famotidine twice daily every day to suppress the itching  Hydroxyzine as needed  Consider doxepin for night use if you really need

## 2023-04-18 NOTE — NURSING NOTE
"Chief Complaint   Patient presents with     Derm Problem         Initial /70 (BP Location: Right arm, Patient Position: Sitting, Cuff Size: Adult Regular)   Pulse 108   Temp 98.6  F (37  C) (Tympanic)   Resp 18   Ht 1.855 m (6' 1.03\")   Wt 94 kg (207 lb 3.2 oz)   SpO2 95%   BMI 27.31 kg/m   Estimated body mass index is 27.31 kg/m  as calculated from the following:    Height as of this encounter: 1.855 m (6' 1.03\").    Weight as of this encounter: 94 kg (207 lb 3.2 oz).         Melody Pooz LPN         Advance Care Directive on file? No  Advance Care Directive provided to patient? Declined       FOOD SECURITY SCREENING QUESTIONS:    The next two questions are to help us understand your food security.  If you are feeling you need any assistance in this area, we have resources available to support you today.    Hunger Vital Signs:  Within the past 12 months we worried whether our food would run out before we got money to buy more. Never  Within the past 12 months the food we bought just didn't last and we didn't have money to get more. Never  Melody Pozo LPN,LPN on 4/18/2023 at 4:33 PM      Food Insecurity: Not on file     "

## 2023-04-18 NOTE — PROGRESS NOTES
Assessment & Plan       ICD-10-CM    1. Dermatographism  L50.3 hydrOXYzine (ATARAX) 25 MG tablet     famotidine (PEPCID) 20 MG tablet     Comprehensive Metabolic Panel     Comprehensive Metabolic Panel      2. Prediabetes  R73.03 Comprehensive Metabolic Panel     Hemoglobin A1c     Comprehensive Metabolic Panel     Hemoglobin A1c      3. Lipid screening  Z13.220 Lipid Panel     Lipid Panel        Is a pre-existing diagnosis of dermatographia some.  Showed some pictures from his cell phone, supporting this diagnosis.  Saw Dr. Felix and had further allergy testing and assessment for mastocytosis.  Testing was unrevealing.  We discussed that baseline treatments are antihistamines.  He is currently using cetirizine 10 mg daily and famotidine 20 mg daily.  Can increase both to twice daily.  He is getting these OTC, declined refill.  Continue to use hydroxyzine as needed.  Other potential treatments include doxepin.  He does have some difficulty sleeping, but a lot of this is due to work stress Sunday to Wednesday night.  He sleeps better Thursday to Saturday.  We also reviewed the option to utilize Xolair if antihistamines are ineffective.  He declines pursuit of additional treatments at this time.    Has an eczematous patch on left elbow.  Using hydrocortisone with benefit.  May also use a steroid cream to help with itching.  Declines a prescription for triamcinolone cream at this time.    Labs last year returned with an A1c of 6%.  Made diet changes.  A1c down to 5.5%.  Lipids overall are stable.  Statin not warranted.  The 10-year ASCVD risk score (Sadie BONNER, et al., 2019) is: 2.8%    Values used to calculate the score:      Age: 43 years      Sex: Male      Is Non- : No      Diabetic: No      Tobacco smoker: No      Systolic Blood Pressure: 130 mmHg      Is BP treated: No      HDL Cholesterol: 38 mg/dL      Total Cholesterol: 207 mg/dL       31 minutes spent by me on the date of the  "encounter doing review of outside records, review of test results, interpretation of tests, patient visit and documentation     Follow up 1 year    Arnol Bryant MD  [facility]   Kemal is a 43 year old, presenting for the following health issues:  Derm Problem        4/18/2023     4:26 PM   Additional Questions   Roomed by PANCHO Oliver   Accompanied by -         4/18/2023     4:26 PM   Patient Reported Additional Medications   Patient reports taking the following new medications -     History of Present Illness       Reason for visit:  Follow up and hopefully answers fir previous issues    He eats 2-3 servings of fruits and vegetables daily.He consumes 0 sweetened beverage(s) daily.He exercises with enough effort to increase his heart rate 30 to 60 minutes per day.  He exercises with enough effort to increase his heart rate 4 days per week.   He is taking medications regularly.     Saw Dr Felix for itching  Diagnosed with dermatographism  Had testing with RAST IGE, tryptase  Taking cetirizine 10 mg and hydroxyzine every evening    Review of Systems   As above      Objective    /70 (BP Location: Right arm, Patient Position: Sitting, Cuff Size: Adult Regular)   Pulse 108   Temp 98.6  F (37  C) (Tympanic)   Resp 18   Ht 1.855 m (6' 1.03\")   Wt 94 kg (207 lb 3.2 oz)   SpO2 95%   BMI 27.31 kg/m    Body mass index is 27.31 kg/m .  Physical Exam   General Appearance: Alert. No acute distress  Chest/Respiratory Exam: Clear to auscultation bilaterally  Cardiovascular Exam: Regular rate and rhythm. S1, S2, no murmur, gallop, or rubs.  Extremities: 2+ pedal pulses.  No lower extremity edema.  Psychiatric: Normal affect and mentation  Skin: Eczematous, minimally scaled lesion on left olecranon    Results for orders placed or performed in visit on 04/18/23   Comprehensive Metabolic Panel     Status: Abnormal   Result Value Ref Range    Sodium 140 136 - 145 mmol/L    Potassium " 4.2 3.4 - 5.3 mmol/L    Chloride 101 98 - 107 mmol/L    Carbon Dioxide (CO2) 28 22 - 29 mmol/L    Anion Gap 11 7 - 15 mmol/L    Urea Nitrogen 15.1 6.0 - 20.0 mg/dL    Creatinine 0.89 0.67 - 1.17 mg/dL    Calcium 9.2 8.6 - 10.0 mg/dL    Glucose 104 (H) 70 - 99 mg/dL    Alkaline Phosphatase 79 40 - 129 U/L    AST 25 10 - 50 U/L    ALT 22 10 - 50 U/L    Protein Total 8.1 6.4 - 8.3 g/dL    Albumin 4.7 3.5 - 5.2 g/dL    Bilirubin Total 0.6 <=1.2 mg/dL    GFR Estimate >90 >60 mL/min/1.73m2   Hemoglobin A1c     Status: Normal   Result Value Ref Range    Hemoglobin A1C 5.5 4.0 - 6.2 %   Lipid Panel     Status: Abnormal   Result Value Ref Range    Cholesterol 207 (H) <200 mg/dL    Triglycerides 283 (H) <150 mg/dL    Direct Measure HDL 38 (L) >=40 mg/dL    LDL Cholesterol Calculated 112 (H) <=100 mg/dL    Non HDL Cholesterol 169 (H) <130 mg/dL    Narrative    Cholesterol  Desirable:  <200 mg/dL    Triglycerides  Normal:  Less than 150 mg/dL  Borderline High:  150-199 mg/dL  High:  200-499 mg/dL  Very High:  Greater than or equal to 500 mg/dL    Direct Measure HDL  Female:  Greater than or equal to 50 mg/dL   Male:  Greater than or equal to 40 mg/dL    LDL Cholesterol  Desirable:  <100mg/dL  Above Desirable:  100-129 mg/dL   Borderline High:  130-159 mg/dL   High:  160-189 mg/dL   Very High:  >= 190 mg/dL    Non HDL Cholesterol  Desirable:  130 mg/dL  Above Desirable:  130-159 mg/dL  Borderline High:  160-189 mg/dL  High:  190-219 mg/dL  Very High:  Greater than or equal to 220 mg/dL

## 2024-02-20 NOTE — PROCEDURES
WatchPAT - HOME SLEEP STUDY INTERPRETATION    Patient: Kemal Garibay  MRN: [id number]  YOB: 1980  Study Date: 5/24/2022  Referring Provider: No Ref-Primary, Physician  Ordering Provider: Van Castaneda MD, MD    Chain of custody patient verification was not enabled.       Indications for Home Study: Kemal Garibay is a 42 year old male with minimal past medical history who presents with symptoms suggestive of obstructive sleep apnea given concerns of snoring, poor sleep quality, insomnia.    Estimated body mass index is 30.92 kg/m  as calculated from the following:    Height as of 4/1/22: 1.829 m (6').    Weight as of 4/1/22: 103.4 kg (228 lb).  [place] Sleepiness Scale: 13/24  STOP-BANG: 3/8    Data: A full night home sleep study was performed recording the standard physiologic parameters including peripheral arterial tonometry (PAT), sound/snoring, body position,  movement, sound, and oxygen saturation by pulse oximetry. Pulse rate was estimated by oximetry recording. Sleep staging (wake, REM, light, and deep sleep) was derived from PAT signal.  This study was considered adequate based on > 4 hours of quality oximetry and respiratory recording. As specified by the AASM Manual for the Scoring of Sleep and Associated events, version 2.3, Rule VIII.D 1B, 4% oxygen desaturation scoring for hypopneas is used as a standard of care on all home sleep apnea testing.    Total Recording Time: 7 hrs, 18 min  Total Sleep Time: 6 hrs, 10 min  % of Sleep Time REM: 35.1%    Respiratory:  Snoring: Snoring was present, though minimal.  Respiratory events: The PAT respiratory disturbance index [pRDI] was 10.1 events per hour.  The PAT apnea/hypopnea index [pAHI] was 1.8 events per hour.  ASHLI was 1.8 events per hour.  During REM sleep the pAHI was 3.7.  Sleep Associated Hypoxemia: sustained hypoxemia was not present. Mean oxygen saturation was 95%.  Minimum was 91%.  Time with saturation less than 88% was 0  minutes.    Heart Rate: By pulse oximetry normal rate was noted.     Position: Percent of time spent: supine - 49.5%, prone - 6.1%, on right - 44.2%, on left - 0.3%.  pAHI was 2 per hour supine, 5.4 per hour prone, 1.1 per hour on right side, and - per hour on left side.     Assessment:   - No significant sleep-disordered breathing.  - Sleep associated hypoxemia was not present.    Recommendations:  - Consider in-lab PSG if high pre-test clinical concern for ROSALVA given potential for non-diagnostic home sleep testing.  - Suggest optimizing sleep hygiene and avoiding sleep deprivation.  - Weight management.    Diagnosis Code(s): Snoring R06.83    Van Castaneda MD, MD, May 27, 2022   Diplomate, American Board of Family Medicine, Sleep Medicine     20-Feb-2024

## 2024-04-11 ENCOUNTER — HOSPITAL ENCOUNTER (EMERGENCY)
Facility: OTHER | Age: 44
Discharge: HOME | End: 2024-04-11
Attending: EMERGENCY MEDICINE | Admitting: EMERGENCY MEDICINE
Payer: OTHER MISCELLANEOUS

## 2024-04-11 VITALS
SYSTOLIC BLOOD PRESSURE: 141 MMHG | BODY MASS INDEX: 25.58 KG/M2 | TEMPERATURE: 97.8 F | HEART RATE: 81 BPM | OXYGEN SATURATION: 96 % | DIASTOLIC BLOOD PRESSURE: 88 MMHG | RESPIRATION RATE: 10 BRPM | HEIGHT: 73 IN | WEIGHT: 193 LBS
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
## 2024-04-11 LAB
ANION GAP SERPL CALCULATED.3IONS-SCNC: 13 MMOL/L (ref 7–15)
BASOPHILS # BLD AUTO: 0 10E3/UL (ref 0–0.2)
BASOPHILS NFR BLD AUTO: 1 %
BUN SERPL-MCNC: 14 MG/DL (ref 6–20)
CALCIUM SERPL-MCNC: 9.2 MG/DL (ref 8.6–10)
CHLORIDE SERPL-SCNC: 105 MMOL/L (ref 98–107)
CK SERPL-CCNC: 293 U/L (ref 39–308)
CREAT SERPL-MCNC: 0.86 MG/DL (ref 0.67–1.17)
DEPRECATED HCO3 PLAS-SCNC: 22 MMOL/L (ref 22–29)
EGFRCR SERPLBLD CKD-EPI 2021: >90 ML/MIN/1.73M2
EOSINOPHIL # BLD AUTO: 0.1 10E3/UL (ref 0–0.7)
EOSINOPHIL NFR BLD AUTO: 3 %
ERYTHROCYTE [DISTWIDTH] IN BLOOD BY AUTOMATED COUNT: 12.3 % (ref 10–15)
GLUCOSE SERPL-MCNC: 96 MG/DL (ref 70–99)
HCT VFR BLD AUTO: 41.6 % (ref 40–53)
HGB BLD-MCNC: 14.5 G/DL (ref 13.3–17.7)
HOLD SPECIMEN: NORMAL
IMM GRANULOCYTES # BLD: 0 10E3/UL
IMM GRANULOCYTES NFR BLD: 0 %
LYMPHOCYTES # BLD AUTO: 1.5 10E3/UL (ref 0.8–5.3)
LYMPHOCYTES NFR BLD AUTO: 26 %
MCH RBC QN AUTO: 29.4 PG (ref 26.5–33)
MCHC RBC AUTO-ENTMCNC: 34.9 G/DL (ref 31.5–36.5)
MCV RBC AUTO: 84 FL (ref 78–100)
MONOCYTES # BLD AUTO: 0.4 10E3/UL (ref 0–1.3)
MONOCYTES NFR BLD AUTO: 7 %
NEUTROPHILS # BLD AUTO: 3.6 10E3/UL (ref 1.6–8.3)
NEUTROPHILS NFR BLD AUTO: 64 %
NRBC # BLD AUTO: 0 10E3/UL
NRBC BLD AUTO-RTO: 0 /100
PLATELET # BLD AUTO: 241 10E3/UL (ref 150–450)
POTASSIUM SERPL-SCNC: 3.7 MMOL/L (ref 3.4–5.3)
RBC # BLD AUTO: 4.93 10E6/UL (ref 4.4–5.9)
SODIUM SERPL-SCNC: 140 MMOL/L (ref 135–145)
WBC # BLD AUTO: 5.6 10E3/UL (ref 4–11)

## 2024-04-11 PROCEDURE — 99284 EMERGENCY DEPT VISIT MOD MDM: CPT | Performed by: EMERGENCY MEDICINE

## 2024-04-11 PROCEDURE — 36415 COLL VENOUS BLD VENIPUNCTURE: CPT | Performed by: EMERGENCY MEDICINE

## 2024-04-11 PROCEDURE — 82550 ASSAY OF CK (CPK): CPT | Performed by: EMERGENCY MEDICINE

## 2024-04-11 PROCEDURE — 93010 ELECTROCARDIOGRAM REPORT: CPT | Performed by: INTERNAL MEDICINE

## 2024-04-11 PROCEDURE — 82374 ASSAY BLOOD CARBON DIOXIDE: CPT | Performed by: EMERGENCY MEDICINE

## 2024-04-11 PROCEDURE — 93005 ELECTROCARDIOGRAM TRACING: CPT | Performed by: EMERGENCY MEDICINE

## 2024-04-11 PROCEDURE — 82565 ASSAY OF CREATININE: CPT | Performed by: EMERGENCY MEDICINE

## 2024-04-11 PROCEDURE — 85025 COMPLETE CBC W/AUTO DIFF WBC: CPT | Performed by: EMERGENCY MEDICINE

## 2024-04-11 ASSESSMENT — ACTIVITIES OF DAILY LIVING (ADL)
ADLS_ACUITY_SCORE: 35
ADLS_ACUITY_SCORE: 35

## 2024-04-11 ASSESSMENT — COLUMBIA-SUICIDE SEVERITY RATING SCALE - C-SSRS
6. HAVE YOU EVER DONE ANYTHING, STARTED TO DO ANYTHING, OR PREPARED TO DO ANYTHING TO END YOUR LIFE?: NO
2. HAVE YOU ACTUALLY HAD ANY THOUGHTS OF KILLING YOURSELF IN THE PAST MONTH?: NO
1. IN THE PAST MONTH, HAVE YOU WISHED YOU WERE DEAD OR WISHED YOU COULD GO TO SLEEP AND NOT WAKE UP?: NO
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
## 2024-04-11 NOTE — Clinical Note
Kemal Garibay was seen and treated in our emergency department on 4/11/2024.  He may return to work on 04/12/2024.       If you have any questions or concerns, please don't hesitate to call.      Brandi Ayala MD
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
## 2024-04-12 LAB
ATRIAL RATE - MUSE: 81 BPM
DIASTOLIC BLOOD PRESSURE - MUSE: NORMAL MMHG
INTERPRETATION ECG - MUSE: NORMAL
P AXIS - MUSE: 42 DEGREES
PR INTERVAL - MUSE: 136 MS
QRS DURATION - MUSE: 90 MS
QT - MUSE: 382 MS
QTC - MUSE: 443 MS
R AXIS - MUSE: 74 DEGREES
SYSTOLIC BLOOD PRESSURE - MUSE: NORMAL MMHG
T AXIS - MUSE: 34 DEGREES
VENTRICULAR RATE- MUSE: 81 BPM

## 2024-04-15 ENCOUNTER — OFFICE VISIT (OUTPATIENT)
Dept: FAMILY MEDICINE | Facility: OTHER | Age: 44
End: 2024-04-15
Attending: CHIROPRACTOR
Payer: COMMERCIAL

## 2024-04-15 VITALS
HEART RATE: 76 BPM | DIASTOLIC BLOOD PRESSURE: 73 MMHG | BODY MASS INDEX: 26.24 KG/M2 | HEIGHT: 73 IN | RESPIRATION RATE: 17 BRPM | OXYGEN SATURATION: 98 % | SYSTOLIC BLOOD PRESSURE: 123 MMHG | WEIGHT: 198 LBS

## 2024-04-15 DIAGNOSIS — T75.4XXA ELECTROCUTION AND NONFATAL EFFECTS OF ELECTRIC CURRENT, INITIAL ENCOUNTER: ICD-10-CM
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
## 2024-04-15 PROCEDURE — 99213 OFFICE O/P EST LOW 20 MIN: CPT | Performed by: CHIROPRACTOR

## 2024-04-15 ASSESSMENT — PAIN SCALES - GENERAL: PAINLEVEL: NO PAIN (0)

## 2024-04-15 NOTE — PROGRESS NOTES
CHIEF COMPLAINT:   Chief Complaint   Patient presents with    Work Comp       HISTORY OF PRESENTING INJURY     Kemal is a pleasant 44-year-old here for work comp follow-up of a electric shock type injury.  He was on the lift working up in the air when he got too close to a power line, within 4 feet, and experienced a small arc like shock on the left helmet region.  He presented to the ED for evaluation and no abnormal findings were found.  Since the injury, he denies any symptoms or changes.  He denies any faintness, heart like conditions, changes in sleep, vision changes, or any other neurological abnormalities.  He has been working without restrictions.        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 Arshad       ALLERGIES:  No Known Allergies    CURRENT MEDICATIONS:  Current Outpatient Medications   Medication Sig Dispense Refill    cetirizine (ZYRTEC) 10 MG tablet Take 1 tablet (10 mg) by mouth 2 times daily 180 tablet 4    famotidine (PEPCID) 20 MG tablet Take 1 tablet (20 mg) by mouth 2 times daily 180 tablet 4    hydrOXYzine (ATARAX) 25 MG tablet Take 1 tablet (25 mg) by mouth daily as needed for itching 90 tablet 4       SOCIAL HISTORY:  Social History     Socioeconomic History    Marital status:      Spouse name: Not on file    Number of children: Not on file    Years of education: Not on file    Highest education level: Not on file   Occupational History    Not on file   Tobacco Use    Smoking status: Never    Smokeless tobacco: Former     Types: Chew   Vaping Use    Vaping status: Never Used   Substance and Sexual Activity    Alcohol use: Yes     Comment: weekends-1 six pack    Drug use: Never    Sexual activity: Yes     Partners: Female    Other Topics Concern    Not on file   Social History Narrative    The patient is .  He and his wife have one child with second expected in 2008.  Works at [employer].     Social Determinants of Health     Financial Resource Strain: Unknown (4/15/2024)    Financial Resource Strain     Within the past 12 months, have you or your family members you live with been unable to get utilities (heat, electricity) when it was really needed?: Patient declined   Food Insecurity: Unknown (4/15/2024)    Food Insecurity     Within the past 12 months, did you worry that your food would run out before you got money to buy more?: Patient declined     Within the past 12 months, did the food you bought just not last and you didn t have money to get more?: Patient declined   Transportation Needs: Unknown (4/15/2024)    Transportation Needs     Within the past 12 months, has lack of transportation kept you from medical appointments, getting your medicines, non-medical meetings or appointments, work, or from getting things that you need?: Patient declined   Physical Activity: Not on file   Stress: Not on file   Social Connections: Not on file   Interpersonal Safety: Low Risk  (4/15/2024)    Interpersonal Safety     Do you feel physically and emotionally safe where you currently live?: Yes     Within the past 12 months, have you been hit, slapped, kicked or otherwise physically hurt by someone?: No     Within the past 12 months, have you been humiliated or emotionally abused in other ways by your partner or ex-partner?: No   Housing Stability: Unknown (4/15/2024)    Housing Stability     Do you have housing? : Patient declined     Are you worried about losing your housing?: Patient declined       FAMILY HISTORY:  Family History   Problem Relation Age of Onset    No Known Problems Mother     Diabetes Father     Chronic Obstructive Pulmonary Disease Father     No Known Problems Maternal Grandmother     Coronary Artery  "Disease Maternal Grandfather 82    Diabetes Paternal Grandfather     Colon Cancer No family hx of     Prostate Cancer No family hx of        REVIEW OF SYSTEMS:    Unremarkable other than chief complaint      PHYSICAL EXAM:   /73   Pulse 76   Resp 17   Ht 1.854 m (6' 1\")   Wt 89.8 kg (198 lb)   SpO2 98%   BMI 26.12 kg/m   Body mass index is 26.12 kg/m .  General Appearance: No acute distress.  Normal mentation, appropriate responses, normal eye tracking.  He has no ataxia.  Neurologically intact.    IMPRESSION/PLAN:    Monitor for signs and symptoms over the next 4 weeks, we will hold his file open until May 13, 2024.  She would not hear from Kemal by that date, we will close the work comp case.  He is capable of working without restrictions and new workability issued with 2 copies given.    Total time spent today in chart review/preparation, face to face evaluation, and documentation: 23 minutes.      Adriano Rangel DC, ELINOR, OPAL  Director - Occupational Medicine Department  Diplomate of the American Board of [employer]essionals  Board Certified - American Board of Independent Medical Examiners      8:15 AM 4/15/2024    "

## 2024-07-01 DIAGNOSIS — L50.3 DERMATOGRAPHISM: ICD-10-CM

## 2024-07-09 RX ORDER — FAMOTIDINE 20 MG/1
20 TABLET, FILM COATED ORAL 2 TIMES DAILY
Qty: 60 TABLET | Refills: 1 | Status: SHIPPED | OUTPATIENT
Start: 2024-07-09

## 2024-07-09 RX ORDER — HYDROXYZINE HYDROCHLORIDE 25 MG/1
25 TABLET, FILM COATED ORAL DAILY PRN
Qty: 30 TABLET | Refills: 2 | Status: SHIPPED | OUTPATIENT
Start: 2024-07-09

## 2024-07-09 NOTE — TELEPHONE ENCOUNTER
GI sent Rx request for the following:      Requested Prescriptions   Pending Prescriptions Disp Refills    famotidine (PEPCID) 20 MG tablet [Pharmacy Med Name: famotidine 20 mg tablet] 180 tablet 4     Sig: Take 1 tablet (20 mg) by mouth 2 times daily       H2 Blockers Protocol Failed - 7/9/2024  9:19 AM    hydrOXYzine HCl (ATARAX) 25 MG tablet [Pharmacy Med Name: hydroxyzine HCl 25 mg tablet] 90 tablet 4     Sig: Take 1 tablet (25 mg) by mouth daily as needed for itching       Antihistamines Protocol Passed - 7/9/2024  9:19 AM     Last Prescription Date:   4/18/23  Last Fill Qty/Refills:         180, 90, R-4    Last Office Visit:              4/18/23   Future Office visit:            none     Routing to covering provider for refill consideration, as PCP/provider is out of clinic >48 hours or Pt is completely out of medication and provider is out of the clinic today.  Marlin Greene RN on 7/9/2024 at 9:20 AM

## 2024-07-09 NOTE — TELEPHONE ENCOUNTER
Called and notified patient that a  month of medication was sent to [facility] pharmacy. I did let him know that he will need to schedule an appt with a new provider as  is no longer in the clinic. Offered to transfer to the appt line, patient declined at this time. He will call back. Geni Andrade LPN .......................7/9/2024  10:35 AM

## 2025-05-17 ENCOUNTER — HEALTH MAINTENANCE LETTER (OUTPATIENT)
Age: 45
End: 2025-05-17